# Patient Record
Sex: FEMALE | Race: WHITE | Employment: FULL TIME | URBAN - NONMETROPOLITAN AREA
[De-identification: names, ages, dates, MRNs, and addresses within clinical notes are randomized per-mention and may not be internally consistent; named-entity substitution may affect disease eponyms.]

---

## 2017-05-20 ENCOUNTER — HOSPITAL ENCOUNTER (EMERGENCY)
Facility: HOSPITAL | Age: 37
Discharge: HOME | End: 2017-05-20
Attending: NURSE PRACTITIONER | Admitting: NURSE PRACTITIONER
Payer: COMMERCIAL

## 2017-05-20 VITALS
RESPIRATION RATE: 16 BRPM | HEART RATE: 85 BPM | DIASTOLIC BLOOD PRESSURE: 79 MMHG | SYSTOLIC BLOOD PRESSURE: 130 MMHG | OXYGEN SATURATION: 98 % | TEMPERATURE: 98.3 F

## 2017-05-20 DIAGNOSIS — T14.8XXA BRUISED: ICD-10-CM

## 2017-05-20 DIAGNOSIS — I83.90 VARICOSITIES OF LEG: ICD-10-CM

## 2017-05-20 LAB
ALBUMIN SERPL-MCNC: 4 G/DL (ref 3.4–5)
ALP SERPL-CCNC: 53 U/L (ref 40–150)
ALT SERPL W P-5'-P-CCNC: 11 U/L (ref 0–50)
AST SERPL W P-5'-P-CCNC: 6 U/L (ref 0–45)
BASOPHILS # BLD AUTO: 0.1 10E9/L (ref 0–0.2)
BASOPHILS NFR BLD AUTO: 0.9 %
BILIRUB DIRECT SERPL-MCNC: <0.1 MG/DL (ref 0–0.2)
BILIRUB SERPL-MCNC: 0.4 MG/DL (ref 0.2–1.3)
DIFFERENTIAL METHOD BLD: NORMAL
EOSINOPHIL # BLD AUTO: 0.2 10E9/L (ref 0–0.7)
EOSINOPHIL NFR BLD AUTO: 3.2 %
ERYTHROCYTE [DISTWIDTH] IN BLOOD BY AUTOMATED COUNT: 12 % (ref 10–15)
HCT VFR BLD AUTO: 38.6 % (ref 35–47)
HGB BLD-MCNC: 13.1 G/DL (ref 11.7–15.7)
IMM GRANULOCYTES # BLD: 0 10E9/L (ref 0–0.4)
IMM GRANULOCYTES NFR BLD: 0.4 %
INR PPP: 1.06 (ref 0.8–1.2)
LYMPHOCYTES # BLD AUTO: 2.2 10E9/L (ref 0.8–5.3)
LYMPHOCYTES NFR BLD AUTO: 32.3 %
MCH RBC QN AUTO: 29.8 PG (ref 26.5–33)
MCHC RBC AUTO-ENTMCNC: 33.9 G/DL (ref 31.5–36.5)
MCV RBC AUTO: 88 FL (ref 78–100)
MONOCYTES # BLD AUTO: 0.5 10E9/L (ref 0–1.3)
MONOCYTES NFR BLD AUTO: 6.8 %
NEUTROPHILS # BLD AUTO: 3.9 10E9/L (ref 1.6–8.3)
NEUTROPHILS NFR BLD AUTO: 56.4 %
NRBC # BLD AUTO: 0 10*3/UL
NRBC BLD AUTO-RTO: 0 /100
PLATELET # BLD AUTO: 271 10E9/L (ref 150–450)
PROT SERPL-MCNC: 7.5 G/DL (ref 6.8–8.8)
RBC # BLD AUTO: 4.4 10E12/L (ref 3.8–5.2)
WBC # BLD AUTO: 6.9 10E9/L (ref 4–11)

## 2017-05-20 PROCEDURE — 85025 COMPLETE CBC W/AUTO DIFF WBC: CPT | Performed by: NURSE PRACTITIONER

## 2017-05-20 PROCEDURE — 85610 PROTHROMBIN TIME: CPT | Performed by: NURSE PRACTITIONER

## 2017-05-20 PROCEDURE — 80076 HEPATIC FUNCTION PANEL: CPT | Performed by: NURSE PRACTITIONER

## 2017-05-20 PROCEDURE — 99213 OFFICE O/P EST LOW 20 MIN: CPT | Performed by: NURSE PRACTITIONER

## 2017-05-20 PROCEDURE — 36415 COLL VENOUS BLD VENIPUNCTURE: CPT | Performed by: NURSE PRACTITIONER

## 2017-05-20 PROCEDURE — 99213 OFFICE O/P EST LOW 20 MIN: CPT

## 2017-05-20 NOTE — ED AVS SNAPSHOT
[facility]    [address] [address]    Phone:  [phone]                                       Sheila Mir   MRN: [id number]    Department:  [facility]   Date of Visit:  5/20/2017           After Visit Summary Signature Page     I have received my discharge instructions, and my questions have been answered. I have discussed any challenges I see with this plan with the nurse or doctor.    ..........................................................................................................................................  Patient/Patient Representative Signature      ..........................................................................................................................................  Patient Representative Print Name and Relationship to Patient    ..................................................               ................................................  Date                                            Time    ..........................................................................................................................................  Reviewed by Signature/Title    ...................................................              ..............................................  Date                                                            Time

## 2017-05-20 NOTE — DISCHARGE INSTRUCTIONS
Self-Care for Spider and Varicose Veins  Your doctor may suggest that you try self-care. Exercising and maintaining a healthy weight may keep problem veins from getting worse. Wearing elastic stockings and elevating your legs can help improve blood flow. Taking breaks when you sit or stand helps, too.     The top of the elastic stocking should be below the bend in your knee for a proper fit.   Exercising  Exercising is good for your veins because it improves blood flow. Walking, cycling, or swimming are great exercises for vein health. But be sure to check with your doctor before starting any exercise program. Also, keep these hints in mind:    When exercising, start out slowly and try to build up to 30 minutes on most days.    Elevate your legs above heart level after exercise to keep blood from pooling in veins.  Maintaining a healthy weight  Being overweight puts extra pressure on your veins. To maintain a healthy weight, try these tips:    Choose lean meats, fish, and skinless chicken.    Use low-fat dairy products.    Eat foods high in fiber, such as whole grains, fruits, and vegetables.    Cut down on sugar, salt, and fats such as butter.    Exercise regularly.  Wearing elastic stockings  Elastic stockings gently squeeze veins so blood flows upward. If you need elastic stockings, your doctor can prescribe them for you. Follow your doctor s advice about how and when to wear them. Elastic stockings should fit snugly.  Elevating your legs  Raising your legs above heart level will help relieve swelling and keep blood from pooling in veins. Try to elevate your legs for 15 to 20 minutes at the end of the day, and whenever you re relaxing. To make sure your legs are raised above heart level, prop them up on cushions or large pillows.  When sitting and standing  To keep blood moving when you have to sit or stand for long periods, try these tips:    At work, take walking breaks instead of coffee breaks. Walk during  your lunch hour. Or try flexing your feet up and down 10 times each hour.    When standing, raise yourself up and down on your toes, or rock back and forth on your heels.    3381-4713 [employer] [address] [place] [address], [place] [address]. All rights reserved. This information is not intended as a substitute for professional medical care. Always follow your healthcare professional's instructions.

## 2017-05-20 NOTE — ED AVS SNAPSHOT
[facility]    [address] [address]    Phone:  [phone]                                       Sheila Mir   MRN: [id number]    Department[facility]  [facility]   Date of Visit:  5/20/2017           Patient Information     Date Of Birth          1980        Your diagnoses for this visit were:     Varicosities of leg     Bruised        You were seen by Angelita Wong NP.      Follow-up Information     Follow up with Celina Sandoval NP.    Why:  As needed, If symptoms worsen    Contact information:    [facility] [place]  [address] [facility] [place] [address] [phone]          Follow up with [facility].    Specialty:  EMERGENCY MEDICINE    Why:  As needed, If symptoms worsen    Contact information:    [address] [address] [phone]    Additional information:    From [facility]: Take [address]. Turn left at [address]. Turn left at the first stoplight on [address]. At the first stop sign, take a right onto [address]. Take a left into the parking lot and continue through until you reach the North enterance of the building.       From [place]: Take [address]. Take the [address] ramp towards [place]. Turn left onto [address]. Take a slight right onto [address]. Turn left at the first stoplight on [address]. At the first stop sign, take a right onto [address]. Take a left into the parking lot and continue through until you reach the North enterance of the building.       From [place]: Take [address]. Take a right at [address]. At the first stop sign, take a right onto [address]. Take a left into the parking lot and continue through until you reach the North enterance of the building.         Discharge Instructions         Self-Care for Spider and Varicose Veins  Your doctor may suggest that you try self-care. Exercising and  maintaining a healthy weight may keep problem veins from getting worse. Wearing elastic stockings and elevating your legs can help improve blood flow. Taking breaks when you sit or stand helps, too.     The top of the elastic stocking should be below the bend in your knee for a proper fit.   Exercising  Exercising is good for your veins because it improves blood flow. Walking, cycling, or swimming are great exercises for vein health. But be sure to check with your doctor before starting any exercise program. Also, keep these hints in mind:    When exercising, start out slowly and try to build up to 30 minutes on most days.    Elevate your legs above heart level after exercise to keep blood from pooling in veins.  Maintaining a healthy weight  Being overweight puts extra pressure on your veins. To maintain a healthy weight, try these tips:    Choose lean meats, fish, and skinless chicken.    Use low-fat dairy products.    Eat foods high in fiber, such as whole grains, fruits, and vegetables.    Cut down on sugar, salt, and fats such as butter.    Exercise regularly.  Wearing elastic stockings  Elastic stockings gently squeeze veins so blood flows upward. If you need elastic stockings, your doctor can prescribe them for you. Follow your doctor s advice about how and when to wear them. Elastic stockings should fit snugly.  Elevating your legs  Raising your legs above heart level will help relieve swelling and keep blood from pooling in veins. Try to elevate your legs for 15 to 20 minutes at the end of the day, and whenever you re relaxing. To make sure your legs are raised above heart level, prop them up on cushions or large pillows.  When sitting and standing  To keep blood moving when you have to sit or stand for long periods, try these tips:    At work, take walking breaks instead of coffee breaks. Walk during your lunch hour. Or try flexing your feet up and down 10 times each hour.    When standing, raise yourself  up and down on your toes, or rock back and forth on your heels.    3319-2241 The [employer]. [address] [place] [address], [place] [address]. All rights reserved. This information is not intended as a substitute for professional medical care. Always follow your healthcare professional's instructions.          Future Appointments        Provider Department Dept Phone Center    5/25/2017 10:00 AM Bertrand Petersen DO [facility] [place] [phone] [place]         Review of your medicines      Our records show that you are taking the medicines listed below. If these are incorrect, please call your family doctor or clinic.        Dose / Directions Last dose taken    albuterol 108 (90 BASE) MCG/ACT Inhaler   Commonly known as:  PROAIR HFA/PROVENTIL HFA/VENTOLIN HFA   Dose:  2 puff        Inhale 2 puffs into the lungs every 4 hours as needed for shortness of breath / dyspnea or wheezing   Refills:  0        ALLERGEN IMMUNOTHERAPY PRESCRIPTION   Quantity:  52 Dose        Proceed with SCIT per standard build up protocol.   Refills:  0        EFFEXOR PO   Dose:  37.5 mg        Take 37.5 mg by mouth daily   Refills:  0        EPINEPHrine 0.3 MG/0.3ML injection   Dose:  0.3 mg   Quantity:  2 each        Inject 0.3 mLs (0.3 mg) into the muscle once as needed   Refills:  11        KLARON 10 % lotion   Generic drug:  sulfacetamide sodium (Acne)        Externally apply topically 2 times daily   Refills:  0        KLONOPIN PO   Dose:  1 mg        Take 1 mg by mouth daily as needed for anxiety   Refills:  0        tretinoin 0.025 % cream   Commonly known as:  RETIN-A        Apply topically At Bedtime   Refills:  0                Procedures and tests performed during your visit     CBC with platelets differential    Hepatic panel    INR      Orders Needing Specimen Collection     None      Pending Results     No orders found from 5/18/2017 to 5/21/2017.            Pending Culture Results     No orders found from  "2017 to 2017.            Thank you for choosing [facility]       Thank you for choosing [facility] for your care. Our goal is always to provide you with excellent care. Hearing back from our patients is one way we can continue to improve our services. Please take a few minutes to complete the written survey that you may receive in the mail after you visit with us. Thank you!        [employer] Information     [employer] lets you send messages to your doctor, view your test results, renew your prescriptions, schedule appointments and more. To sign up, go to [email or web address] . Click on \"Log in\" on the left side of the screen, which will take you to the Welcome page. Then click on \"Sign up Now\" on the right side of the page.     You will be asked to enter the access code listed below, as well as some personal information. Please follow the directions to create your username and password.     Your access code is: [email or web address]  Expires: 2017  4:21 PM     Your access code will  in 90 days. If you need help or a new code, please call your [facility] clinic or [phone].        Care EveryWhere ID     This is your Care EveryWhere ID. This could be used by other organizations to access your [facility] medical records  [id number]        After Visit Summary       This is your record. Keep this with you and show to your community pharmacist(s) and doctor(s) at your next visit.                  "

## 2017-05-20 NOTE — ED NOTES
Presents today with c/o of siscomfort from vericose veins and bruises on left leg states woke up today and it was worse.

## 2017-05-23 ASSESSMENT — ENCOUNTER SYMPTOMS
GASTROINTESTINAL NEGATIVE: 1
CONSTITUTIONAL NEGATIVE: 1
NEUROLOGICAL NEGATIVE: 1
MUSCULOSKELETAL NEGATIVE: 1
EYES NEGATIVE: 1
RESPIRATORY NEGATIVE: 1

## 2017-05-23 NOTE — ED PROVIDER NOTES
History     Chief Complaint   Patient presents with     Varicose Vein     The history is provided by the patient. No  was used.     Sheila Mir is a 36 year old female who presents with bilateral lower extremity , varicosities.  She notes these bilaterally and reports that she has noted some minimal bruising along side each of the major ones for the past few days.  They are always aching and no change in that.  She has an appointment with a surgeon later this month and is mainly concerned as she has never noted the bruising before.  She is well otherwise    I have reviewed the Medications, Allergies, Past Medical and Surgical History, and Social History in the Epic system.    Review of Systems   Constitutional: Negative.    HENT: Negative.    Eyes: Negative.    Respiratory: Negative.    Gastrointestinal: Negative.    Genitourinary: Negative.    Musculoskeletal: Negative.    Neurological: Negative.    Hematological:        Varicosities       Physical Exam   BP: 130/79  Pulse: 85  Temp: 98.3  F (36.8  C)  Resp: 16  SpO2: 98 %  Physical Exam   Constitutional: She is oriented to person, place, and time. She appears well-developed and well-nourished.   HENT:   Head: Normocephalic and atraumatic.   Eyes: Conjunctivae are normal.   Neck: Normal range of motion.   Cardiovascular: Normal rate.    Pulmonary/Chest: Effort normal.   Musculoskeletal: Normal range of motion.   Neurological: She is alert and oriented to person, place, and time.   Skin: Skin is warm and dry.   Bilateral varicosities both legs with some bruising noted all along the left leg upper and lower , small bruises, brown finger tip size in about a 12 inch length from the thigh to lower leg, on the right some larger purple bruising about 2.5 cm in size, around the varicosity that is bulging more.   Nursing note and vitals reviewed.      ED Course     ED Course     Procedures             Lab work obtained and reviewed, essentially  normal.  She has an appointment with the surgeon advised to keep this. Suspect some intermittent bleeding and subsequent bruising under the skin.     Labs Ordered and Resulted from Time of ED Arrival Up to the Time of Departure from the ED   CBC WITH PLATELETS DIFFERENTIAL   HEPATIC PANEL   INR       Assessments & Plan (with Medical Decision Making)     I have reviewed the nursing notes.    I have reviewed the findings, diagnosis, plan and need for follow up with the patient.    Pathophysiology, possible etiology and treatment with potential outcomes, risks, benefits, and alternatives discussed to the best of my ability      Pt verbalizes understanding and agreement with plan.  Follow up for worsening symptoms      Discharge Medication List as of 5/20/2017  4:21 PM          Final diagnoses:   Varicosities of leg   Bruised       5/20/2017   [facility]     Angelita Wong NP  05/23/17 [id number]

## 2017-05-25 ENCOUNTER — OFFICE VISIT (OUTPATIENT)
Dept: SURGERY | Facility: OTHER | Age: 37
End: 2017-05-25
Attending: SURGERY
Payer: COMMERCIAL

## 2017-05-25 VITALS
OXYGEN SATURATION: 98 % | TEMPERATURE: 98.4 F | WEIGHT: 165 LBS | BODY MASS INDEX: 23.1 KG/M2 | SYSTOLIC BLOOD PRESSURE: 120 MMHG | HEART RATE: 67 BPM | DIASTOLIC BLOOD PRESSURE: 64 MMHG | HEIGHT: 71 IN

## 2017-05-25 DIAGNOSIS — I83.93 VARICOSE VEINS OF BOTH LOWER EXTREMITIES: Primary | ICD-10-CM

## 2017-05-25 PROCEDURE — 99213 OFFICE O/P EST LOW 20 MIN: CPT

## 2017-05-25 PROCEDURE — 99243 OFF/OP CNSLTJ NEW/EST LOW 30: CPT | Performed by: SURGERY

## 2017-05-25 RX ORDER — SULFACETAMIDE SODIUM 100 MG/ML
LOTION TOPICAL
COMMUNITY
Start: 2012-05-23

## 2017-05-25 RX ORDER — ALBUTEROL SULFATE 90 UG/1
2 AEROSOL, METERED RESPIRATORY (INHALATION)
COMMUNITY
Start: 2016-01-06

## 2017-05-25 RX ORDER — VENLAFAXINE HYDROCHLORIDE 75 MG/1
75 CAPSULE, EXTENDED RELEASE ORAL
COMMUNITY
Start: 2017-02-01

## 2017-05-25 RX ORDER — CLONAZEPAM 1 MG/1
1 TABLET ORAL
COMMUNITY
Start: 2017-02-01

## 2017-05-25 RX ORDER — TRETINOIN 0.25 MG/G
CREAM TOPICAL
COMMUNITY
Start: 2015-02-18

## 2017-05-25 RX ORDER — EPINEPHRINE 0.3 MG/.3ML
0.3 INJECTION SUBCUTANEOUS
COMMUNITY

## 2017-05-25 ASSESSMENT — PAIN SCALES - GENERAL: PAINLEVEL: NO PAIN (0)

## 2017-05-25 NOTE — MR AVS SNAPSHOT
After Visit Summary   5/25/2017    Sheila Mir    MRN: [id number]           Patient Information     Date Of Birth          1980        Visit Information        Provider Department      5/25/2017 10:00 AM Bertrand Petersen, DO [facility]        Today's Diagnoses     Varicose veins of both lower extremities    -  1      Care Instructions    Thank you for allowing Dr. Petersen and our surgical team to participate in your care. Please call with any scheduling questions or concerns to [facility] at [phone] or for nursing questions [place] [phone].  You have an order placed with [facility]. They will call you to set up an appointment for your test specified by Dr. Petersen.  Dr. Toledo comes in once a month to see patients. When the DI department knows when he will be here they will call and get you scheduled to meet with him to get your ablation procedure reset up. Call me at my direct line( provided for you above) if you do not here from their  within a months time and I will call and see what is the hold up.          Follow-ups after your visit        Who to contact     If you have questions or need follow up information about today's clinic visit or your schedule please contact [facility] directly at [phone].  Normal or non-critical lab and imaging results will be communicated to you by MyChart, letter or phone within 4 business days after the clinic has received the results. If you do not hear from us within 7 days, please contact the clinic through MyChart or phone. If you have a critical or abnormal lab result, we will notify you by phone as soon as possible.  Submit refill requests through [employer]gu or call your pharmacy and they will forward the refill request to us. Please allow 3 business days for your refill to be completed.          Additional Information About Your Visit        [employer] Information     [employer]gu lets you send  "messages to your doctor, view your test results, renew your prescriptions, schedule appointments and more. To sign up, go to [email or web address] . Click on \"Log in\" on the left side of the screen, which will take you to the Welcome page. Then click on \"Sign up Now\" on the right side of the page.     You will be asked to enter the access code listed below, as well as some personal information. Please follow the directions to create your username and password.     Your access code is: [email or web address]  Expires: 2017  4:21 PM     Your access code will  in 90 days. If you need help or a new code, please call your [facility] clinic or [phone].        Care EveryWhere ID     This is your Care EveryWhere ID. This could be used by other organizations to access your [facility] medical records  [id number]        Your Vitals Were     Pulse Temperature Height Pulse Oximetry BMI (Body Mass Index)       67 98.4  F (36.9  C) (Tympanic) 5' 11\" (1.803 m) 98% 23.01 kg/m2        Blood Pressure from Last 3 Encounters:   17 120/64   17 130/79   16 110/64    Weight from Last 3 Encounters:   17 165 lb (74.8 kg)   16 164 lb (74.4 kg)   16 170 lb (77.1 kg)              Today, you had the following     No orders found for display         Today's Medication Changes          These changes are accurate as of: 17 10:49 AM.  If you have any questions, ask your nurse or doctor.               These medicines have changed or have updated prescriptions.        Dose/Directions    albuterol 108 (90 BASE) MCG/ACT Inhaler   Commonly known as:  PROAIR HFA/PROVENTIL HFA/VENTOLIN HFA   This may have changed:  Another medication with the same name was removed. Continue taking this medication, and follow the directions you see here.   Changed by:  Bertrand Petersen, DO        Dose:  2 puff   Inhale 2 puffs into the lungs   Refills:  0                Primary Care Provider Office Phone # Fax #    Celina N " ROSALIA Sandoval [phone]       [facility] [place] [address]  [facility] [address]        Thank you!     Thank you for choosing [facility]  for your care. Our goal is always to provide you with excellent care. Hearing back from our patients is one way we can continue to improve our services. Please take a few minutes to complete the written survey that you may receive in the mail after your visit with us. Thank you!             Your Updated Medication List - Protect others around you: Learn how to safely use, store and throw away your medicines at www.disposemymeds.org.          This list is accurate as of: 5/25/17 10:49 AM.  Always use your most recent med list.                   Brand Name Dispense Instructions for use    albuterol 108 (90 BASE) MCG/ACT Inhaler    PROAIR HFA/PROVENTIL HFA/VENTOLIN HFA     Inhale 2 puffs into the lungs       ALLERGEN IMMUNOTHERAPY PRESCRIPTION     52 Dose    Proceed with SCIT per standard build up protocol.       clonazePAM 1 MG tablet    klonoPIN     Take 1 mg by mouth       EPINEPHrine 0.3 MG/0.3ML injection      Inject 0.3 mg into the muscle       sulfacetamide sodium (Acne) 10 % lotion          tretinoin 0.025 % cream    RETIN-A         venlafaxine 75 MG 24 hr capsule    EFFEXOR-XR     Take 75 mg by mouth

## 2017-05-25 NOTE — NURSING NOTE
"Chief Complaint   Patient presents with     Consult     varicose veins.  Dr Sandoval is primary.  Bilateral and above knees.  Been bothering her for years, getting itchy-ultrasound done a year ago-getting very bothersome.  Bruising very easily where the veins are located.  Tired, swollen legs at the end of the day.         Initial /64 (BP Location: Right arm, Patient Position: Chair, Cuff Size: Adult Small)  Pulse 67  Temp 98.4  F (36.9  C) (Tympanic)  Ht 5' 11\" (1.803 m)  Wt 165 lb (74.8 kg)  SpO2 98%  BMI 23.01 kg/m2 Estimated body mass index is 23.01 kg/(m^2) as calculated from the following:    Height as of this encounter: 5' 11\" (1.803 m).    Weight as of this encounter: 165 lb (74.8 kg).  Medication Reconciliation: tony CARNEY      "

## 2017-05-25 NOTE — PROGRESS NOTES
Surgery Consult Clinic Note      RE: Sheila Mir  : 1980  JOAO: 2017      Chief Complaint:  Varicose veins    History of Present Illness:  Mrs. Mir is a very pleasant 36 year old year old female who I am seeing at the request of Celina Monet for evaluation of varicose veins.  She's met with Dr. Toledo last year and was a candidate for RFA but canceled secondary to going to a wedding.  She's had issues with heaviness, itching and restless legs for over 5 years.  She worn compression stockings for over a year with no relief.  Recently she complains of skin changes over one of the varicose veins on the left lower extremity and easy bruising.      Medical history:  Past Medical History:   Diagnosis Date     Acne      Acute pharyngitis      Acute tonsillitis      ADD (attention deficit disorder) without hyperactivity      Adenotonsillar hypertrophy      Anxiety      Chronic tonsillitis      Conversion disorder      Dyschromia      Endometriosis, site unspecified 2011     Generalized anxiety disorder 2011     Panic disorder without agoraphobia      Pectus excavatum      Postoperative nausea      Pregnant state, incidental 2011     Seasonal allergies      Sleep disorder 2011     Status post tonsillectomy and adenoidectomy      Strep throat exposure      Unspecified asthma(493.90) 2011       Surgical history:  Past Surgical History:   Procedure Laterality Date     -Cytotec      Pregnancy management; Live infant; APGAR:9 (1 min) 9 (5 min)     DILATE CERVIX, HYSTEROSCOPY, ABLATE ENDOMETRIUM, COMBINED N/A 2014    Procedure: COMBINED DILATE CERVIX, HYSTEROSCOPY, ABLATE ENDOMETRIUM;  Surgeon: Nik Alvarez MD;  Location: [facility]     open heart surgery       TONSILLECTOMY, ADENOIDECTOMY, COMBINED Bilateral 2016    Procedure: COMBINED TONSILLECTOMY, ADENOIDECTOMY;  Surgeon: Nan Reyez MD;  Location: [facility]       Family history:  Family History   Problem  Relation Age of Onset     Other - See Comments Other      ADD/ADHD     Depression Other      CANCER Mother      endometrial     Thyroid Disease Mother      disease     Alzheimer Disease Maternal Grandmother      HEART DISEASE Maternal Grandfather 63     myocardial infarction       Medications:  Current Outpatient Prescriptions   Medication Sig Dispense Refill     albuterol (PROAIR HFA/PROVENTIL HFA/VENTOLIN HFA) 108 (90 BASE) MCG/ACT Inhaler Inhale 2 puffs into the lungs       clonazePAM (KLONOPIN) 1 MG tablet Take 1 mg by mouth       sulfacetamide sodium, Acne, 10 % lotion        tretinoin (RETIN-A) 0.025 % cream        venlafaxine (EFFEXOR-XR) 75 MG 24 hr capsule Take 75 mg by mouth       EPINEPHrine 0.3 MG/0.3ML injection Inject 0.3 mg into the muscle       ALLERGY SHOTS Proceed with SCIT per standard build up protocol. 52 Dose 0     [DISCONTINUED] ClonazePAM (KLONOPIN PO) Take 1 mg by mouth daily as needed for anxiety       Allergies:  The patientis allergic to penicillins.  .  Social history:  Social History   Substance Use Topics     Smoking status: Former Smoker     Packs/day: 0.50     Years: 10.00     Types: Cigarettes     Smokeless tobacco: Never Used      Comment: Year Quit: 2010     Alcohol use Yes      Comment: occasionally     Marital status: .    Review of Systems:    Constitutional: Negative for fever, chills and weight loss.   HENT: Negative for ear pain, nosebleeds, congestion, sore throat, tinnitus and ear discharge.    Eyes: Negative for blurred vision, double vision, photophobia and pain.   Respiratory: Negative for cough, hemoptysis, shortness of breath, wheezing and stridor.    Cardiovascular: Negative for chest pain, palpitations and orthopnea.   Gastrointestinal: Negative for heartburn, nausea, vomiting, abdominal pain and blood in stool.   Genitourinary: Negative for urgency, frequency and hematuria.   Musculoskeletal: Negative for myalgias, back pain and joint pain.   Neurological:  "Negative for tingling, speech change and headaches.   Endo/Heme/Allergies: Does not bruise/bleed easily.   Psychiatric/Behavioral: Negative for depression, suicidal ideas and hallucinations. The patient is not nervous/anxious.    Physical Examination:  /64 (BP Location: Right arm, Patient Position: Chair, Cuff Size: Adult Small)  Pulse 67  Temp 98.4  F (36.9  C) (Tympanic)  Ht 5' 11\" (1.803 m)  Wt 165 lb (74.8 kg)  SpO2 98%  BMI 23.01 kg/m2  General: AAOx4, NAD, WN/WD, ambulating without assistance  HEENT:NCAT, EOMI, PERRL Sclerae anicteric; Trachea mideline, no JVD  Chest:   Clear to auscultation bilaterally.  Cardiac: S1S2 , regular rate and rhythm without additional sounds  Abdomen: Soft, ND/NT no rebound, no guarding  Extremities: LLE: palpable engorged vein medial aspect tibial that extends above the knee along the course of the great saphenous vein.  There's a large path of red scale without ulceration.  The are multiple satellite lesions scattered around, trace edema.  The same rash is seen on the right just not a prominent and not associated with a varicosity.    Skin: Warm, dry, < 2 sec cap refill  Neuro: CN 2-12 grossly intact, no focal deficit, GCS 15  Psych: happy, calm, asks appropriate questions      Assessment/Plan:  #1 varicose vein  #2 B/L LE rash    Will set up with Dr. Toledo again.  I'm not sure what to make of the rash, no any kind of ulcer that I've seen with varicose vein, venous insuffiencey, etc.          Dr Petersen  [facility]  [address], [address]  [place]    [address]    Referring Provider:  No referring provider defined for this encounter.     Primary Care Provider:  Celina Sandoval  "

## 2017-07-17 ENCOUNTER — TELEPHONE (OUTPATIENT)
Dept: SURGERY | Facility: OTHER | Age: 37
End: 2017-07-17

## 2017-07-17 NOTE — TELEPHONE ENCOUNTER
Patient called and spoke with writer stating that she has not been set up to have her vericose veins procedure taken care of yet. Patient was not pleased that she has not been scheduled for this yet and says she needs it taken care of and cannot keep waiting on [place] to get their stuff together. Patient is moving in two weeks and demanded that this be scheduled before she moves. She was last in to see Dr Petersen on 5-25-17 and has not heard anything regarding this. Please call patient back ASA to talk to her. [phone].

## 2017-07-17 NOTE — TELEPHONE ENCOUNTER
Spoke with patient, she did contact administration about the issue with scheduling down in DI. She also did contact the DI department and did get scheduled for her consult with the radiologist. Patient thanked writer for her call.    Allyson Hughes

## 2017-07-18 ENCOUNTER — HOSPITAL ENCOUNTER (OUTPATIENT)
Dept: ULTRASOUND IMAGING | Facility: HOSPITAL | Age: 37
End: 2017-07-18
Attending: SURGERY
Payer: COMMERCIAL

## 2017-07-20 DIAGNOSIS — I83.90 VARICOSE VEIN OF LEG: Primary | ICD-10-CM

## 2017-07-27 DIAGNOSIS — I83.92 ASYMPTOMATIC VARICOSE VEINS, LEFT: Primary | ICD-10-CM

## 2017-07-28 ENCOUNTER — TELEPHONE (OUTPATIENT)
Dept: INTERVENTIONAL RADIOLOGY/VASCULAR | Facility: HOSPITAL | Age: 37
End: 2017-07-28

## 2017-07-28 NOTE — TELEPHONE ENCOUNTER
This patient is scheduled for a vein ablation on 8/3/2017. She has packed her weston hose away and would need another one for after this ablation is preformed. . Patient states that she is moving on 8/4/2017 out of states and will not be following up on this as well. Will you call in her weston hose to the [employer] supply. Thanks. Marge

## 2017-08-03 ENCOUNTER — HOSPITAL ENCOUNTER (OUTPATIENT)
Dept: ULTRASOUND IMAGING | Facility: HOSPITAL | Age: 37
Discharge: HOME | End: 2017-08-03
Attending: SURGERY | Admitting: SURGERY
Payer: COMMERCIAL

## 2017-08-03 VITALS
SYSTOLIC BLOOD PRESSURE: 96 MMHG | RESPIRATION RATE: 20 BRPM | DIASTOLIC BLOOD PRESSURE: 65 MMHG | TEMPERATURE: 98 F | OXYGEN SATURATION: 97 %

## 2017-08-03 PROCEDURE — 25000132 ZZH RX MED GY IP 250 OP 250 PS 637: Performed by: RADIOLOGY

## 2017-08-03 PROCEDURE — 25000132 ZZH RX MED GY IP 250 OP 250 PS 637

## 2017-08-03 PROCEDURE — C1894 INTRO/SHEATH, NON-LASER: HCPCS

## 2017-08-03 PROCEDURE — 36475 ENDOVENOUS RF 1ST VEIN: CPT | Mod: TC

## 2017-08-03 PROCEDURE — C1888 ENDOVAS NON-CARDIAC ABL CATH: HCPCS | Mod: TC

## 2017-08-03 RX ORDER — IBUPROFEN 200 MG
TABLET ORAL
Status: DISPENSED
Start: 2017-08-03 | End: 2017-08-03

## 2017-08-03 RX ORDER — DIAZEPAM 10 MG/2ML
5-10 INJECTION, SOLUTION INTRAMUSCULAR; INTRAVENOUS
Status: COMPLETED | OUTPATIENT
Start: 2017-08-03 | End: 2017-08-03

## 2017-08-03 RX ORDER — DIAZEPAM 5 MG
TABLET ORAL
Status: COMPLETED
Start: 2017-08-03 | End: 2017-08-03

## 2017-08-03 RX ORDER — DIAZEPAM 5 MG
5 TABLET ORAL
Status: COMPLETED | OUTPATIENT
Start: 2017-08-03 | End: 2017-08-03

## 2017-08-03 RX ADMIN — DIAZEPAM 5 MG: 5 TABLET ORAL at 08:38

## 2017-08-03 RX ADMIN — NITROGLYCERIN 15 MG: 20 OINTMENT TOPICAL at 08:50

## 2017-08-03 NOTE — DISCHARGE INSTRUCTIONS
Self-care after Radiofrequency Ablation for Varicose Veins    After treatment, you may feel a little sore. You may  have some swelling. You may also have bruising in  the areas where you had shots. You may also experience a pulling or tugging sensation of your leg starting around the third or fourth day.  If you have other  symptoms, please call our office.    Activity  For the next 24 hours:   Don t sit or stand for long periods of time.   If you received Valium, avoid driving or drinking alcohol for 24 hours      For the next week (7 days):   No baths or hot tubs   Avoid excessive sun or tanning booths   Walk at least three times a day for 20 minutes.   You may go back to most normal activities. Avoid   heavy lifting and other strenuous movement.   Raise your legs as often as you can. This will help   reduce swelling.  Bandages and medicines  Wear your compression stockings around the clock except when showering until your follow up ultrasound.  After your ultrasound appointment wear your stockings during the day and remove them at night for one week.    For Comfort take Ibuprofen 400 mg three times per day for one week.  Take with food and plenty of fluids.    Follow-up  You will have an ultrasound on  __________________________________.  This will tell us if the treatment worked. It will  also make sure that no blood clots have formed.    _____________________________________.  Call your Primary Care Provider if you have the following (if your primary care provider is not available please seek emergency care):    Go to the Emergency Room or your Primary Care Provider if you have:    A fever of 101 F (38.3 C) or higher, taken under  the tongue.    Shortness of breath.    Increasing redness, swelling or warmth in your leg.    A leg that feels very tender.

## 2017-08-03 NOTE — IP AVS SNAPSHOT
[facility]    [address] [place] [id number]    Phone:  [phone]                                       After Visit Summary   8/3/2017    Sheila Mir    MRN: [id number]           After Visit Summary Signature Page     I have received my discharge instructions, and my questions have been answered. I have discussed any challenges I see with this plan with the nurse or doctor.    ..........................................................................................................................................  Patient/Patient Representative Signature      ..........................................................................................................................................  Patient Representative Print Name and Relationship to Patient    ..................................................               ................................................  Date                                            Time    ..........................................................................................................................................  Reviewed by Signature/Title    ...................................................              ..............................................  Date                                                            Time

## 2017-08-03 NOTE — IP AVS SNAPSHOT
MRN:[id number]                      After Visit Summary   8/3/2017    Sheila Mir    MRN: [id number]           Visit Information        Provider Department      8/3/2017  8:30 AM Radiologist, Need Interventional; [facility]           Review of your medicines      UNREVIEWED medicines. Ask your doctor about these medicines        Dose / Directions    albuterol 108 (90 BASE) MCG/ACT Inhaler   Commonly known as:  PROAIR HFA/PROVENTIL HFA/VENTOLIN HFA        Dose:  2 puff   Inhale 2 puffs into the lungs   Refills:  0       ALLERGEN IMMUNOTHERAPY PRESCRIPTION   Used for:  Perennial allergic rhinitis        Proceed with SCIT per standard build up protocol.   Quantity:  52 Dose   Refills:  0       clonazePAM 1 MG tablet   Commonly known as:  klonoPIN        Dose:  1 mg   Take 1 mg by mouth   Refills:  0       EPINEPHrine 0.3 MG/0.3ML injection 2-pack   Commonly known as:  EPIPEN/ADRENACLICK/or ANY BX GENERIC EQUIV        Dose:  0.3 mg   Inject 0.3 mg into the muscle   Refills:  0       sulfacetamide sodium (Acne) 10 % lotion        Refills:  0       tretinoin 0.025 % cream   Commonly known as:  RETIN-A        Refills:  0       venlafaxine 75 MG 24 hr capsule   Commonly known as:  EFFEXOR-XR        Dose:  75 mg   Take 75 mg by mouth   Refills:  0                Protect others around you: Learn how to safely use, store and throw away your medicines at www.disposemymeds.org.         Follow-ups after your visit         Care Instructions        Further instructions from your care team       Self-care after Radiofrequency Ablation for Varicose Veins    After treatment, you may feel a little sore. You may  have some swelling. You may also have bruising in  the areas where you had shots. You may also experience a pulling or tugging sensation of your leg starting around the third or fourth day.  If you have other  symptoms, please call our office.    Activity  For the next  "24 hours:   Don t sit or stand for long periods of time.   If you received Valium, avoid driving or drinking alcohol for 24 hours      For the next week (7 days):   No baths or hot tubs   Avoid excessive sun or tanning booths   Walk at least three times a day for 20 minutes.   You may go back to most normal activities. Avoid   heavy lifting and other strenuous movement.   Raise your legs as often as you can. This will help   reduce swelling.  Bandages and medicines  Wear your compression stockings around the clock except when showering until your follow up ultrasound.  After your ultrasound appointment wear your stockings during the day and remove them at night for one week.    For Comfort take Ibuprofen 400 mg three times per day for one week.  Take with food and plenty of fluids.    Follow-up  You will have an ultrasound on  __________________________________.  This will tell us if the treatment worked. It will  also make sure that no blood clots have formed.    _____________________________________.  Call your Primary Care Provider if you have the following (if your primary care provider is not available please seek emergency care):    Go to the Emergency Room or your Primary Care Provider if you have:    A fever of 101 F (38.3 C) or higher, taken under  the tongue.    Shortness of breath.    Increasing redness, swelling or warmth in your leg.    A leg that feels very tender.           Additional Information About Your Visit        [employer] Information     [employer] lets you send messages to your doctor, view your test results, renew your prescriptions, schedule appointments and more. To sign up, go to [email or web address] . Click on \"Log in\" on the left side of the screen, which will take you to the Welcome page. Then click on \"Sign up Now\" on the right side of the page.     You will be asked to enter the access code listed below, as well as some personal information. Please follow the directions to create your " username and password.     Your access code is: [email or web address]  Expires: 2017  4:21 PM     Your access code will  in 90 days. If you need help or a new code, please call your [facility] clinic or [phone].        Care EveryWhere ID     This is your Care EveryWhere ID. This could be used by other organizations to access your [facility] medical records  [id number]        Your Vitals Were     Blood Pressure Temperature Respirations Pulse Oximetry          96/65 98  F (36.7  C) (Tympanic) 20 97%         Primary Care Provider Office Phone # Fax #    Celina Sandoval [phone]      Equal Access to Services     JENNIFER ROSALES : Hadii jaimie garcíao Sojerald, waaxda luqadaha, qaybta kaalmada adeegyada, ange griffiths . So [facility] [phone].    ATENCIÓN: Si habla español, tiene a mariee disposición servicios gratuitos de asistencia lingüística. Llame al [phone].    We comply with applicable federal civil rights laws and [place] laws. We do not discriminate on the basis of race, color, national origin, age, disability sex, sexual orientation or gender identity.            Thank you!     Thank you for choosing [facility] for your care. Our goal is always to provide you with excellent care. Hearing back from our patients is one way we can continue to improve our services. Please take a few minutes to complete the written survey that you may receive in the mail after you visit with us. Thank you!             Medication List: This is a list of all your medications and when to take them. Check marks below indicate your daily home schedule. Keep this list as a reference.      Medications           Morning Afternoon Evening Bedtime As Needed    albuterol 108 (90 BASE) MCG/ACT Inhaler   Commonly known as:  PROAIR HFA/PROVENTIL HFA/VENTOLIN HFA   Inhale 2 puffs into the lungs                                ALLERGEN IMMUNOTHERAPY PRESCRIPTION   Proceed with SCIT per standard build  up protocol.                                clonazePAM 1 MG tablet   Commonly known as:  klonoPIN   Take 1 mg by mouth                                EPINEPHrine 0.3 MG/0.3ML injection 2-pack   Commonly known as:  EPIPEN/ADRENACLICK/or ANY BX GENERIC EQUIV   Inject 0.3 mg into the muscle                                sulfacetamide sodium (Acne) 10 % lotion                                tretinoin 0.025 % cream   Commonly known as:  RETIN-A                                venlafaxine 75 MG 24 hr capsule   Commonly known as:  EFFEXOR-XR   Take 75 mg by mouth

## 2021-10-12 ENCOUNTER — TRANSFERRED RECORDS (OUTPATIENT)
Dept: HEALTH INFORMATION MANAGEMENT | Facility: HOSPITAL | Age: 41
End: 2021-10-12

## 2023-05-12 NOTE — PATIENT INSTRUCTIONS
Thank you for allowing Dr. Petersen and our surgical team to participate in your care. Please call with any scheduling questions or concerns to Nan at [phone] or for nursing questions Allyson [phone].  You have an order placed with [facility]. They will call you to set up an appointment for your test specified by Dr. Petersen.  Dr. Toledo comes in once a month to see patients. When the DI department knows when he will be here they will call and get you scheduled to meet with him to get your ablation procedure reset up. Call me at my direct line( provided for you above) if you do not here from their  within a months time and I will call and see what is the hold up.  
independent